# Patient Record
Sex: MALE | Race: WHITE | NOT HISPANIC OR LATINO | Employment: FULL TIME
[De-identification: names, ages, dates, MRNs, and addresses within clinical notes are randomized per-mention and may not be internally consistent; named-entity substitution may affect disease eponyms.]

---

## 2018-12-28 ENCOUNTER — TRANSCRIBE ORDERS (OUTPATIENT)
Dept: SCHEDULING | Age: 58
End: 2018-12-28

## 2018-12-28 DIAGNOSIS — R07.9 CHEST PAIN: Primary | ICD-10-CM

## 2019-01-04 ENCOUNTER — TRANSCRIBE ORDERS (OUTPATIENT)
Dept: REGISTRATION | Facility: HOSPITAL | Age: 59
End: 2019-01-04

## 2019-01-04 ENCOUNTER — HOSPITAL ENCOUNTER (OUTPATIENT)
Dept: CARDIOLOGY | Facility: HOSPITAL | Age: 59
Discharge: HOME | End: 2019-01-04
Attending: FAMILY MEDICINE
Payer: COMMERCIAL

## 2019-01-04 DIAGNOSIS — R07.9 CHEST PAIN: ICD-10-CM

## 2019-01-04 PROCEDURE — 93005 ELECTROCARDIOGRAM TRACING: CPT

## 2019-01-05 LAB
ATRIAL RATE: 84
P AXIS: 48
PR INTERVAL: 146
QRS DURATION: 82
QT INTERVAL: 360
QTC CALCULATION(BAZETT): 425
R AXIS: -5
T WAVE AXIS: 37
VENTRICULAR RATE: 84

## 2019-01-05 PROCEDURE — 93010 ELECTROCARDIOGRAM REPORT: CPT | Performed by: INTERNAL MEDICINE

## 2019-12-27 ENCOUNTER — TELEPHONE (OUTPATIENT)
Dept: GASTROENTEROLOGY | Facility: CLINIC | Age: 59
End: 2019-12-27

## 2020-01-30 NOTE — TELEPHONE ENCOUNTER
Dr Abner Skelton has been contacted to schedule recall egd with no response-please advise   Thank you

## 2021-04-30 ENCOUNTER — TELEPHONE (OUTPATIENT)
Dept: GASTROENTEROLOGY | Facility: CLINIC | Age: 61
End: 2021-04-30

## 2021-04-30 NOTE — TELEPHONE ENCOUNTER
Pt knows that he is due for a colonoscopy  He did receive the letter  He wanted to let us know that he is now ignoring the letter  He is experiencing symptoms  Told him he would have to schedule an OV before they would do the procedure  He does not currently have his license and his wife just had surgery and is unable to drive

## 2021-04-30 NOTE — TELEPHONE ENCOUNTER
Lengthy call w/ Pt  He needs a colonoscopy/has off & on severe abdominal pain; cannot sched OV and/or proc at this time-he cannot drive and his wife had foot surg cannot drive; hopefully his granddaughter will be able to transport him  He spoke w/ someone earlier who told him he will probably need an OV first/I suggested maybe a virtual appt  He has sev'l health issues/was ref'd back to GI from Dr Baez/urology; has off & on severe abdominal pain/when it comes it brings him to his knees; no rectal bleeding; hx of diverticulitis  # cell [phone]

## 2021-05-03 NOTE — TELEPHONE ENCOUNTER
I spoke with patient, his pain is lower left side, bowel movements normal, no blood  Patient placed on schedule 5/4/21  Previous records sent for scanning

## 2021-05-20 ENCOUNTER — TELEPHONE (OUTPATIENT)
Dept: GASTROENTEROLOGY | Facility: CLINIC | Age: 61
End: 2021-05-20

## 2021-05-20 RX ORDER — ASPIRIN 81 MG/1
81 TABLET ORAL DAILY
COMMUNITY
End: 2021-06-02

## 2021-05-20 RX ORDER — ROSUVASTATIN CALCIUM 5 MG/1
5 TABLET, COATED ORAL DAILY
COMMUNITY

## 2021-05-20 RX ORDER — PANTOPRAZOLE SODIUM 20 MG/1
20 TABLET, DELAYED RELEASE ORAL DAILY
COMMUNITY
End: 2021-06-02

## 2021-05-21 ENCOUNTER — TELEPHONE (OUTPATIENT)
Dept: GASTROENTEROLOGY | Facility: CLINIC | Age: 61
End: 2021-05-21

## 2021-05-21 ENCOUNTER — OFFICE VISIT (OUTPATIENT)
Dept: GASTROENTEROLOGY | Facility: CLINIC | Age: 61
End: 2021-05-21
Payer: COMMERCIAL

## 2021-05-21 VITALS
BODY MASS INDEX: 22.44 KG/M2 | SYSTOLIC BLOOD PRESSURE: 140 MMHG | DIASTOLIC BLOOD PRESSURE: 88 MMHG | HEIGHT: 67 IN | WEIGHT: 143 LBS

## 2021-05-21 DIAGNOSIS — Z86.010 PERSONAL HISTORY OF COLONIC POLYPS: ICD-10-CM

## 2021-05-21 DIAGNOSIS — R10.12 LEFT UPPER QUADRANT ABDOMINAL PAIN: Primary | ICD-10-CM

## 2021-05-21 DIAGNOSIS — K22.70 BARRETT'S ESOPHAGUS WITHOUT DYSPLASIA: ICD-10-CM

## 2021-05-21 PROCEDURE — 99214 OFFICE O/P EST MOD 30 MIN: CPT | Performed by: INTERNAL MEDICINE

## 2021-05-21 RX ORDER — DEXTROAMPHETAMINE SACCHARATE, AMPHETAMINE ASPARTATE, DEXTROAMPHETAMINE SULFATE AND AMPHETAMINE SULFATE 2.5; 2.5; 2.5; 2.5 MG/1; MG/1; MG/1; MG/1
10 TABLET ORAL
COMMUNITY

## 2021-05-21 RX ORDER — RAMIPRIL 5 MG/1
CAPSULE ORAL
COMMUNITY
Start: 2021-02-19

## 2021-05-21 NOTE — TELEPHONE ENCOUNTER
Why does your doctor want you to have this procedure? Hx polyps, acid reflux    Do you have kidney disease?  no  If yes, are you on dialysis :     Have you had diverticulitis within the past 2 months? no    Are you diabetic?  no  If yes, insulin dependent: If yes, provide diabetic instructions sheet     Do take iron supplements?  no  If yes, instruct patient to hold iron supplement for 7 days prior    Are you on a blood thinner? no   Was the blood thinner sheet complete and faxed to cardiologist no  Plavix (clopidogrel), Coumadin (warfarin), Lovenox (enoxaparin), Xarelto (rivaroxaban), Pradaxa(dabigatran), Eliquis(apixaban) Savaysa/Lixiana (edoxapan)    Do you have an automatic implantable cardiac defibrillator (AICD)/pacemaker ([facility])? no  Was AICD/pacemaker sheet completed and faxed to cardiologist? no    Are you on home oxygen? no  If yes, continuous or nocturnal:     Have you been treated for MRSA, VRE or any communicable diseases? no    Heart attack, stroke, or stent within 3 months? no  Schedule at Hospital if within 3-6 months   Use nitroglycerin for chest pain in the last 6 months? no    History of organ  transplant?  no   If yes, notify Endo      History of neck/throat/tongue surgery or cancer? no  IF yes, notify Endo      Any problems with anesthesia in the past? no     Was stool C diff ordered?  no Stool specimen needs to be completed prior to procedure    Do have any facial or body piercings?no     Do you have a latex allergy? no     Do have an allergy to metals? (Bravo study only) no     If pediatric patient, was consent faxed to pediatrician no     Patient rights reviewed yes    Miralax prep given and instructions emailed to patient

## 2021-05-21 NOTE — PROGRESS NOTES
[facility] - Outpatient Follow-up Note  Wilian Stone 63 y o  male MRN: [id number]  Encounter: [id number]      ASSESSMENT AND PLAN:     69-year-old male presents with left upper quadrant abdominal pain  History of colonoscopy 2016 with multiple polyps, EGD 2017 revealed Woodward's Esophagus  1  Left upper quadrant abdominal pain    Patient presents today with left upper quadrant abdominal pain  States the pain has been there for quite some time  Patient also follows with urology for BPH and has been prescribed Flomax which he states he has not taken due to increased urination affecting his work  and states ultrasound was negative for kidney stones  He denies nausea, vomiting, diarrhea or bleeding  Abdominal exam normal  however patient did experience point tenderness with palpation to lower part of his left rib cage suggesting possible muscle skeletal involvement  -  Continue follow-up with urologist  -   Follow-up muscle skeletal discomfort with PCP  -  Scheduling for follow-up colonoscopy at [address] [place] [address]      2  Woodward's esophagus without dysplasia    He shared that he does have periodic swallowing problems more so with solid foods  He states that since beginning Protonix he has not had any more problems with acid reflux     -  Scheduling for EGD for surveillance of Woodward's at approximately at [facility]  -  Discussed need to stop smoking and alcohol consumption  -  Continue PPI      3  Personal history of colonic polyps    - Colonoscopy was performed in May of 2016 multiple polyps removed and recommended 5 year follow-up  We will schedule colonoscopy along with EGD       Followup Appointment:   Post colonoscopy and endoscopy     ______________________________________________________________________    Chief Complaint   Patient presents with    Abdominal Pain     left side severe episodes, due to for colon     HPI:     He presents today with complaint of left-sided abdominal pain  He states pain is sharp and intermittent has been recurring over the last year  He states he also sees urology for BPH and has been prescribed Flomax however 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 because 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  He denies nausea vomiting or diarrhea  He does note that upon swallowing at times food does feel like it gets stuck momentarily  He is having daily normal bowel movements and denies any bloody appearance  He states that his GERD symptoms have subsided with the use of the 20 mg daily of Protonix  Patient discloses that he continues to smoke approximately 1 pack of cigarettes a day, 1 cup of coffee a day and has recently began to drink alcohol daily  He shared he is a recovering alcoholic and that he lost his license due to multiple DUI's and he has had 3 suicidal attempts most recently a drug overdose June 2019  He states increased personal issues at home and both his sister and his son's terminal cancer illness diagnoses, he has resumed drinking alcohol  He shared that he engages in anal sex and denies any rectal bleeding or problems at this time        Historical Information   Past Medical History:   Diagnosis Date    Woodward esophagus     Colon polyp     Depression     GERD (gastroesophageal reflux disease)     Hyperlipidemia      Past Surgical History:   Procedure Laterality Date    COLONOSCOPY      KNEE SURGERY      UPPER GASTROINTESTINAL ENDOSCOPY       Social History     Substance and Sexual Activity   Alcohol Use Yes    Frequency: 4 or more times a week    Drinks per session: 1 or 2     Social History     Substance and Sexual Activity   Drug Use Not on file     Social History     Tobacco Use   Smoking Status Current Every Day Smoker   Smokeless Tobacco Never Used     Family History   Problem Relation Age of Onset    Heart disease Mother     Stroke Father     Colon polyps Neg Hx     Colon cancer Neg Hx          Current Outpatient Medications:     amphetamine-dextroamphetamine (ADDERALL) 10 mg tablet    pantoprazole (PROTONIX) 20 mg tablet    ramipril (ALTACE) 5 mg capsule    rosuvastatin (CRESTOR) 5 mg tablet    aspirin (ECOTRIN LOW STRENGTH) 81 mg EC tablet    sertraline (ZOLOFT) 50 mg tablet  No Known Allergies  Reviewed medications and allergies and updated as indicated    PHYSICAL EXAM:    Blood pressure 140/88, height 5' 7" (1 702 m), weight 64 9 kg (143 lb)  Body mass index is 22 4 kg/m²  General Appearance: NAD, cooperative, alert  Eyes: Anicteric, PERRLA, EOMI  ENT:  Normocephalic, atraumatic, normal mucosa  Neck:  Supple, symmetrical, trachea midline  Resp:  Clear to auscultation bilaterally; no rales, rhonchi or wheezing; respirations unlabored   CV:  S1 S2, Regular rate and rhythm; no murmur, rub, or gallop  GI:  Soft, non-tender, non-distended; normal bowel sounds; no masses, no organomegaly   Rectal: Deferred  Musculoskeletal: No cyanosis, clubbing or edema  Normal ROM  Tenderness with palpation to the lower left rib cage  Skin:  No jaundice, rashes, or lesions   Heme/Lymph: No palpable cervical lymphadenopathy  Psych: Normal affect, good eye contact  Neuro: No gross deficits, AAOx3    Lab Results:    No results to discuss    Radiology Results:   No results found

## 2021-05-21 NOTE — LETTER
May 21, 2021     Toi Benoit MD  [address] [address]    Patient: Sophia Thomson   YOB: 1960   Date of Visit: 5/21/2021       Dear Dr Mishel Hartmann: Thank you for referring Soila Parker to me for evaluation  Below are my notes for this consultation  If you have questions, please do not hesitate to call me  I look forward to following your patient along with you  Sincerely,        Rober Rao PA-C        CC: No Recipients  Jacob Tate   5/21/2021  4:22 PM  Sign when Signing Visit  [facility]  Sophia Thomson 63 y o  male MRN: [id number]  Encounter: [id number]      ASSESSMENT AND PLAN:     57-year-old male presents with left upper quadrant abdominal pain  History of colonoscopy 2016 with multiple polyps, EGD 2017 revealed Woodward's Esophagus  1  Left upper quadrant abdominal pain    Patient presents today with left upper quadrant abdominal pain  States the pain has been there for quite some time  Patient also follows with urology for BPH and has been prescribed Flomax which he states he has not taken due to increased urination affecting his work  and states ultrasound was negative for kidney stones  He denies nausea, vomiting, diarrhea or bleeding  Abdominal exam normal  however patient did experience point tenderness with palpation to lower part of his left rib cage suggesting possible muscle skeletal involvement  -  Continue follow-up with urologist  -   Follow-up muscle skeletal discomfort with PCP  -  Scheduling for follow-up colonoscopy at [address] [place] [address]      2  Woodward's esophagus without dysplasia    He shared that he does have periodic swallowing problems more so with solid foods   He states that since beginning Protonix he has not had any more problems with acid reflux     -  Scheduling for EGD for surveillance of Woodward's at approximately at [facility]  - Discussed need to stop smoking and alcohol consumption  -  Continue PPI      3  Personal history of colonic polyps    - Colonoscopy was performed in May of 2016 multiple polyps removed and recommended 5 year follow-up  We will schedule colonoscopy along with EGD  Followup Appointment:   Post colonoscopy and endoscopy     ______________________________________________________________________    Chief Complaint   Patient presents with    Abdominal Pain     left side severe episodes, due to for colon     HPI:     He presents today with complaint of left-sided abdominal pain  He states pain is sharp and intermittent has been recurring over the last year  He states he also sees urology for BPH and has been prescribed Flomax however 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 because 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  He denies nausea vomiting or diarrhea  He does note that upon swallowing at times food does feel like it gets stuck momentarily  He is having daily normal bowel movements and denies any bloody appearance  He states that his GERD symptoms have subsided with the use of the 20 mg daily of Protonix  Patient discloses that he continues to smoke approximately 1 pack of cigarettes a day, 1 cup of coffee a day and has recently began to drink alcohol daily  He shared he is a recovering alcoholic and that he lost his license due to multiple DUI's and he has had 3 suicidal attempts most recently a drug overdose June 2019  He states increased personal issues at home and both his sister and his son's terminal cancer illness diagnoses, he has resumed drinking alcohol  He shared that he engages in anal sex and denies any rectal bleeding or problems at this time        Historical Information   Past Medical History:   Diagnosis Date    Woodward esophagus     Colon polyp     Depression     GERD (gastroesophageal reflux disease)     Hyperlipidemia      Past Surgical History:   Procedure Laterality Date    COLONOSCOPY      KNEE SURGERY      UPPER GASTROINTESTINAL ENDOSCOPY       Social History     Substance and Sexual Activity   Alcohol Use Yes    Frequency: 4 or more times a week    Drinks per session: 1 or 2     Social History     Substance and Sexual Activity   Drug Use Not on file     Social History     Tobacco Use   Smoking Status Current Every Day Smoker   Smokeless Tobacco Never Used     Family History   Problem Relation Age of Onset    Heart disease Mother     Stroke Father     Colon polyps Neg Hx     Colon cancer Neg Hx          Current Outpatient Medications:     amphetamine-dextroamphetamine (ADDERALL) 10 mg tablet    pantoprazole (PROTONIX) 20 mg tablet    ramipril (ALTACE) 5 mg capsule    rosuvastatin (CRESTOR) 5 mg tablet    aspirin (ECOTRIN LOW STRENGTH) 81 mg EC tablet    sertraline (ZOLOFT) 50 mg tablet  No Known Allergies  Reviewed medications and allergies and updated as indicated    PHYSICAL EXAM:    Blood pressure 140/88, height 5' 7" (1 702 m), weight 64 9 kg (143 lb)  Body mass index is 22 4 kg/m²  General Appearance: NAD, cooperative, alert  Eyes: Anicteric, PERRLA, EOMI  ENT:  Normocephalic, atraumatic, normal mucosa  Neck:  Supple, symmetrical, trachea midline  Resp:  Clear to auscultation bilaterally; no rales, rhonchi or wheezing; respirations unlabored   CV:  S1 S2, Regular rate and rhythm; no murmur, rub, or gallop  GI:  Soft, non-tender, non-distended; normal bowel sounds; no masses, no organomegaly   Rectal: Deferred  Musculoskeletal: No cyanosis, clubbing or edema  Normal ROM  Tenderness with palpation to the lower left rib cage  Skin:  No jaundice, rashes, or lesions   Heme/Lymph: No palpable cervical lymphadenopathy  Psych: Normal affect, good eye contact  Neuro: No gross deficits, AAOx3    Lab Results:    No results to discuss    Radiology Results:   No results found

## 2021-05-21 NOTE — H&P (VIEW-ONLY)
[facility] - Outpatient Follow-up Note  Cady Allen 63 y o  male MRN: [id number]  Encounter: [id number]      ASSESSMENT AND PLAN:     74-year-old male presents with left upper quadrant abdominal pain  History of colonoscopy 2016 with multiple polyps, EGD 2017 revealed Woodward's Esophagus  1  Left upper quadrant abdominal pain    Patient presents today with left upper quadrant abdominal pain  States the pain has been there for quite some time  Patient also follows with urology for BPH and has been prescribed Flomax which he states he has not taken due to increased urination affecting his work  and states ultrasound was negative for kidney stones  He denies nausea, vomiting, diarrhea or bleeding  Abdominal exam normal  however patient did experience point tenderness with palpation to lower part of his left rib cage suggesting possible muscle skeletal involvement  -  Continue follow-up with urologist  -   Follow-up muscle skeletal discomfort with PCP  -  Scheduling for follow-up colonoscopy at [address] [place] [address]      2  Woodward's esophagus without dysplasia    He shared that he does have periodic swallowing problems more so with solid foods  He states that since beginning Protonix he has not had any more problems with acid reflux     -  Scheduling for EGD for surveillance of Woodward's at approximately at [facility]  -  Discussed need to stop smoking and alcohol consumption  -  Continue PPI      3  Personal history of colonic polyps    - Colonoscopy was performed in May of 2016 multiple polyps removed and recommended 5 year follow-up  We will schedule colonoscopy along with EGD       Followup Appointment:   Post colonoscopy and endoscopy     ______________________________________________________________________    Chief Complaint   Patient presents with    Abdominal Pain     left side severe episodes, due to for colon     HPI:     He presents today with complaint of left-sided abdominal pain  He states pain is sharp and intermittent has been recurring over the last year  He states he also sees urology for BPH and has been prescribed Flomax however 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 because 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  He denies nausea vomiting or diarrhea  He does note that upon swallowing at times food does feel like it gets stuck momentarily  He is having daily normal bowel movements and denies any bloody appearance  He states that his GERD symptoms have subsided with the use of the 20 mg daily of Protonix  Patient discloses that he continues to smoke approximately 1 pack of cigarettes a day, 1 cup of coffee a day and has recently began to drink alcohol daily  He shared he is a recovering alcoholic and that he lost his license due to multiple DUI's and he has had 3 suicidal attempts most recently a drug overdose June 2019  He states increased personal issues at home and both his sister and his son's terminal cancer illness diagnoses, he has resumed drinking alcohol  He shared that he engages in anal sex and denies any rectal bleeding or problems at this time        Historical Information   Past Medical History:   Diagnosis Date    Woodward esophagus     Colon polyp     Depression     GERD (gastroesophageal reflux disease)     Hyperlipidemia      Past Surgical History:   Procedure Laterality Date    COLONOSCOPY      KNEE SURGERY      UPPER GASTROINTESTINAL ENDOSCOPY       Social History     Substance and Sexual Activity   Alcohol Use Yes    Frequency: 4 or more times a week    Drinks per session: 1 or 2     Social History     Substance and Sexual Activity   Drug Use Not on file     Social History     Tobacco Use   Smoking Status Current Every Day Smoker   Smokeless Tobacco Never Used     Family History   Problem Relation Age of Onset    Heart disease Mother     Stroke Father     Colon polyps Neg Hx     Colon cancer Neg Hx          Current Outpatient Medications:     amphetamine-dextroamphetamine (ADDERALL) 10 mg tablet    pantoprazole (PROTONIX) 20 mg tablet    ramipril (ALTACE) 5 mg capsule    rosuvastatin (CRESTOR) 5 mg tablet    aspirin (ECOTRIN LOW STRENGTH) 81 mg EC tablet    sertraline (ZOLOFT) 50 mg tablet  No Known Allergies  Reviewed medications and allergies and updated as indicated    PHYSICAL EXAM:    Blood pressure 140/88, height 5' 7" (1 702 m), weight 64 9 kg (143 lb)  Body mass index is 22 4 kg/m²  General Appearance: NAD, cooperative, alert  Eyes: Anicteric, PERRLA, EOMI  ENT:  Normocephalic, atraumatic, normal mucosa  Neck:  Supple, symmetrical, trachea midline  Resp:  Clear to auscultation bilaterally; no rales, rhonchi or wheezing; respirations unlabored   CV:  S1 S2, Regular rate and rhythm; no murmur, rub, or gallop  GI:  Soft, non-tender, non-distended; normal bowel sounds; no masses, no organomegaly   Rectal: Deferred  Musculoskeletal: No cyanosis, clubbing or edema  Normal ROM  Tenderness with palpation to the lower left rib cage  Skin:  No jaundice, rashes, or lesions   Heme/Lymph: No palpable cervical lymphadenopathy  Psych: Normal affect, good eye contact  Neuro: No gross deficits, AAOx3    Lab Results:    No results to discuss    Radiology Results:   No results found

## 2021-06-01 ENCOUNTER — ANESTHESIA EVENT (OUTPATIENT)
Dept: GASTROENTEROLOGY | Facility: AMBULATORY SURGERY CENTER | Age: 61
End: 2021-06-01

## 2021-06-01 PROBLEM — K21.9 GASTROESOPHAGEAL REFLUX DISEASE: Status: ACTIVE | Noted: 2021-06-01

## 2021-06-01 NOTE — ANESTHESIA PREPROCEDURE EVALUATION
Procedure:  COLONOSCOPY  EGD    Relevant Problems   CARDIO   (+) HTN (hypertension)      GI/HEPATIC  Barretts   (+) Gastroesophageal reflux disease      NEURO/PSYCH  ETOH abuse in past  Now has 1 drink per day  (+) Depression   (+) TIA (transient ischemic attack) 2016      PULMONARY   (+) Smoking 1 ppd        Physical Exam    Airway    Mallampati score: II  TM Distance: >3 FB  Neck ROM: full     Dental       Cardiovascular  Cardiovascular exam normal    Pulmonary  Pulmonary exam normal     Other Findings        Anesthesia Plan  ASA Score- 3     Anesthesia Type- IV sedation with anesthesia with ASA Monitors  Additional Monitors:   Airway Plan:           Plan Factors-    Chart reviewed  Patient summary reviewed  Induction- intravenous  Postoperative Plan-     Informed Consent- Anesthetic plan and risks discussed with patient  I personally reviewed this patient with the CRNA  Discussed and agreed on the Anesthesia Plan with the CRNA  Kae Mohan

## 2021-06-02 ENCOUNTER — HOSPITAL ENCOUNTER (OUTPATIENT)
Dept: GASTROENTEROLOGY | Facility: AMBULATORY SURGERY CENTER | Age: 61
Discharge: HOME | End: 2021-06-02
Payer: COMMERCIAL

## 2021-06-02 ENCOUNTER — ANESTHESIA (OUTPATIENT)
Dept: GASTROENTEROLOGY | Facility: AMBULATORY SURGERY CENTER | Age: 61
End: 2021-06-02

## 2021-06-02 VITALS
DIASTOLIC BLOOD PRESSURE: 89 MMHG | HEART RATE: 81 BPM | SYSTOLIC BLOOD PRESSURE: 147 MMHG | TEMPERATURE: 99.3 F | OXYGEN SATURATION: 99 % | RESPIRATION RATE: 15 BRPM

## 2021-06-02 DIAGNOSIS — R10.12 LEFT UPPER QUADRANT ABDOMINAL PAIN: ICD-10-CM

## 2021-06-02 DIAGNOSIS — Z86.010 PERSONAL HISTORY OF COLONIC POLYPS: ICD-10-CM

## 2021-06-02 DIAGNOSIS — K22.70 BARRETT'S ESOPHAGUS WITHOUT DYSPLASIA: ICD-10-CM

## 2021-06-02 PROBLEM — F32.A DEPRESSION: Status: ACTIVE | Noted: 2021-06-02

## 2021-06-02 PROBLEM — F17.200 SMOKING: Status: ACTIVE | Noted: 2021-06-02

## 2021-06-02 PROBLEM — G45.9 TIA (TRANSIENT ISCHEMIC ATTACK): Status: ACTIVE | Noted: 2021-06-02

## 2021-06-02 PROBLEM — I10 HTN (HYPERTENSION): Status: ACTIVE | Noted: 2021-06-02

## 2021-06-02 PROCEDURE — 45380 COLONOSCOPY AND BIOPSY: CPT | Performed by: INTERNAL MEDICINE

## 2021-06-02 PROCEDURE — 43239 EGD BIOPSY SINGLE/MULTIPLE: CPT | Performed by: INTERNAL MEDICINE

## 2021-06-02 PROCEDURE — 45385 COLONOSCOPY W/LESION REMOVAL: CPT | Performed by: INTERNAL MEDICINE

## 2021-06-02 RX ORDER — LIDOCAINE HYDROCHLORIDE 10 MG/ML
INJECTION, SOLUTION EPIDURAL; INFILTRATION; INTRACAUDAL; PERINEURAL AS NEEDED
Status: DISCONTINUED | OUTPATIENT
Start: 2021-06-02 | End: 2021-06-02 | Stop reason: HOSPADM

## 2021-06-02 RX ORDER — PROPOFOL 10 MG/ML
INJECTION, EMULSION INTRAVENOUS AS NEEDED
Status: DISCONTINUED | OUTPATIENT
Start: 2021-06-02 | End: 2021-06-02 | Stop reason: HOSPADM

## 2021-06-02 RX ORDER — SODIUM CHLORIDE 9 MG/ML
50 INJECTION, SOLUTION INTRAVENOUS CONTINUOUS
Status: DISCONTINUED | OUTPATIENT
Start: 2021-06-02 | End: 2021-06-06 | Stop reason: HOSPADM

## 2021-06-02 RX ORDER — OMEPRAZOLE 40 MG/1
40 CAPSULE, DELAYED RELEASE ORAL DAILY
COMMUNITY

## 2021-06-02 RX ORDER — SODIUM CHLORIDE 9 MG/ML
INJECTION, SOLUTION INTRAVENOUS CONTINUOUS PRN
Status: DISCONTINUED | OUTPATIENT
Start: 2021-06-02 | End: 2021-06-02 | Stop reason: HOSPADM

## 2021-06-02 RX ADMIN — PROPOFOL 50 MG: 10 INJECTION, EMULSION INTRAVENOUS at 11:20

## 2021-06-02 RX ADMIN — PROPOFOL 50 MG: 10 INJECTION, EMULSION INTRAVENOUS at 11:31

## 2021-06-02 RX ADMIN — SODIUM CHLORIDE 50 ML/HR: 9 INJECTION, SOLUTION INTRAVENOUS at 10:44

## 2021-06-02 RX ADMIN — PROPOFOL 150 MG: 10 INJECTION, EMULSION INTRAVENOUS at 11:14

## 2021-06-02 RX ADMIN — SODIUM CHLORIDE: 9 INJECTION, SOLUTION INTRAVENOUS at 11:09

## 2021-06-02 RX ADMIN — PROPOFOL 50 MG: 10 INJECTION, EMULSION INTRAVENOUS at 11:41

## 2021-06-02 RX ADMIN — LIDOCAINE HYDROCHLORIDE 50 MG: 10 INJECTION, SOLUTION EPIDURAL; INFILTRATION; INTRACAUDAL; PERINEURAL at 11:15

## 2021-06-02 RX ADMIN — PROPOFOL 50 MG: 10 INJECTION, EMULSION INTRAVENOUS at 11:24

## 2021-06-02 RX ADMIN — PROPOFOL 50 MG: 10 INJECTION, EMULSION INTRAVENOUS at 11:27

## 2021-06-02 RX ADMIN — PROPOFOL 50 MG: 10 INJECTION, EMULSION INTRAVENOUS at 11:36

## 2021-06-02 NOTE — DISCHARGE INSTRUCTIONS
Hemorrhoids   WHAT YOU NEED TO KNOW:   What are hemorrhoids? Hemorrhoids are swollen blood vessels inside your rectum (internal hemorrhoids) or on your anus (external hemorrhoids)  Sometimes a hemorrhoid may prolapse  This means it extends out of your anus  What increases my risk for hemorrhoids? · Pregnancy or obesity    · Straining or sitting for a long time during bowel movements    · Liver disease    · Weak muscles around the anus caused by older age, rectal surgery, or anal intercourse    · A lack of physical activity    · Chronic diarrhea or constipation    · A low-fiber diet    What are the signs and symptoms of hemorrhoids? · Pain or itching around your anus or inside your rectum    · Swelling or bumps around your anus    · Bright red blood in your bowel movement, on the toilet paper, or in the toilet bowl    · Tissue bulging out of your anus (prolapsed hemorrhoids)    · Incontinence (poor control over urine or bowel movements)    How are hemorrhoids diagnosed? Your healthcare provider will ask about your symptoms, the foods you eat, and your bowel movements  He or she will examine your anus for external hemorrhoids  You may need the following:  · A digital rectal exam  is a test to check for hemorrhoids  Your healthcare provider will put a gloved finger inside your anus to feel for the hemorrhoids  · An anoscopy  is a test that uses a scope (small tube with a light and camera on the end) to look at your hemorrhoids  How are hemorrhoids treated? Treatment will depend on your symptoms  You may need any of the following:  · Medicines  can help decrease pain and swelling, and soften your bowel movement  The medicine may be a pill, pad, cream, or ointment  · Procedures  may be used to shrink or remove your hemorrhoid  Examples include rubber-band ligation, sclerotherapy, and photocoagulation  These procedures may be done in your healthcare provider's office   Ask your healthcare provider for more information about these procedures  · Surgery  may be needed to shrink or remove your hemorrhoids  How can I manage my symptoms? · Apply ice on your anus for 15 to 20 minutes every hour or as directed  Use an ice pack, or put crushed ice in a plastic bag  Cover it with a towel before you apply it to your anus  Ice helps prevent tissue damage and decreases swelling and pain  · Take a sitz bath  Fill a bathtub with 4 to 6 inches of warm water  You may also use a sitz bath pan that fits inside a toilet bowl  Sit in the sitz bath for 15 minutes  Do this 3 times a day, and after each bowel movement  The warm water can help decrease pain and swelling  · Keep your anal area clean  Gently wash the area with warm water daily  Soap may irritate the area  After a bowel movement, wipe with moist towelettes or wet toilet paper  Dry toilet paper can irritate the area  How can I help prevent hemorrhoids? · Do not strain to have a bowel movement  Do not sit on the toilet too long  These actions can increase pressure on the tissues in your rectum and anus  · Drink plenty of liquids  Liquids can help prevent constipation  Ask how much liquid to drink each day and which liquids are best for you  · Eat a variety of high-fiber foods  Examples include fruits, vegetables, and whole grains  Ask your healthcare provider how much fiber you need each day  You may need to take a fiber supplement  · Exercise as directed  Exercise, such as walking, may make it easier to have a bowel movement  Ask your healthcare provider to help you create an exercise plan  · Do not have anal sex  Anal sex can weaken the skin around your rectum and anus  · Avoid heavy lifting  This can cause straining and increase your risk for another hemorrhoid  When should I seek immediate care? · You have severe pain in your rectum or around your anus      · You have severe pain in your abdomen and you are vomiting  · You have bleeding from your anus that soaks through your underwear  When should I contact my healthcare provider? · You have frequent and painful bowel movements  · Your hemorrhoid looks or feels more swollen than usual      · You do not have a bowel movement for 2 days or more  · You see or feel tissue coming through your anus  · You have questions or concerns about your condition or care  CARE AGREEMENT:   You have the right to help plan your care  Learn about your health condition and how it may be treated  Discuss treatment options with your healthcare providers to decide what care you want to receive  You always have the right to refuse treatment  The above information is an  only  It is not intended as medical advice for individual conditions or treatments  Talk to your doctor, nurse or pharmacist before following any medical regimen to see if it is safe and effective for you  © Copyright [address] Information is for End User's use only and may not be sold, redistributed or otherwise used for commercial purposes  All illustrations and images included in CareNotes® are the copyrighted property of A D A M , Inc  or [address] [place] [address]  Colorectal Polyps   WHAT YOU NEED TO KNOW:   What are colorectal polyps? Colorectal polyps are small growths of tissue in the lining of the colon and rectum  Most polyps are hyperplastic polyps and are usually benign (noncancerous)  Certain types of polyps, called adenomatous polyps, may turn into cancer  What increases my risk of colorectal polyps? The exact cause of colorectal polyps is unknown   The following may increase your risk:  · Older age    · A diet of foods high in fat and low in fiber     · Family history of polyps    · Intestinal diseases, such as Crohn's disease or ulcerative colitis    · An unhealthy lifestyle, such as physical inactivity, smoking, or drinking alcohol    · Obesity    What are the signs and symptoms of colorectal polyps? · Blood in your bowel movement or bleeding from the rectum    · Change in bowel movement habits, such as diarrhea and constipation    · Abdominal pain    How are colorectal polyps diagnosed? You should have fecal blood screening once a year for colorectal disease if you are over 48years old  You should be screened earlier if you have an intestinal disease or a family history of polyps or colorectal cancer  During this screening, a sample of your bowel movement is checked for blood, which may be an early sign of colorectal polyps or cancer  You may also need any of the following tests:  · Digital rectal exam:  Your healthcare provider will examine your anus and use a finger to check your rectum for polyps  · Barium enema: A barium enema is an x-ray of the colon  A tube is put into your anus, and a liquid called barium is put through the tube  Barium is used so that healthcare providers can see your colon better on the x-ray film  · Virtual colonoscopy: This is a CT scan that takes pictures of the inside of your colon and rectum  A small, flexible tube is put into your rectum and air or carbon dioxide (gas) is used to expand your colon  This lets healthcare providers clearly see your colon and any polyps on a monitor  · Colonoscopy or sigmoidoscopy: These procedures help your healthcare provider see the inside of your colon using a flexible tube with a small light and camera on the end  During a sigmoidoscopy, your healthcare provider will only look at rectum and lower colon  During a colonoscopy, healthcare providers will look at the full length of your colon  Healthcare providers may remove a small amount of tissue from the colon for a biopsy  How are colorectal polyps treated? A polypectomy is a minimally invasive procedure to remove your polyps  They may be removed during a colonoscopy or sigmoidoscopy   Your healthcare provider may need to remove the polyps with a laparoscope  Laparoscopy is done by inserting a small, flexible scope into incisions made on your abdomen  What are the risks of colorectal polyps? You may bleed during a colonoscopy procedure  Your bowel may be perforated (torn) when polyps are removed  This may lead to an open abdominal surgery  During surgery, you may bleed too much or get an infection  Adenomatous polyps that are not removed may turn into cancer and become more difficult to treat  Where can I find support and more information? · [facility] ([facility] [place]  [address], [place] [address]  Phone: [phone]  Web Address: Tanya Patel  [facility] nih gov    When should I contact my healthcare provider? · You have a fever  · You have chills, a cough, or feel weak and achy  · You have abdominal pain that does not go away or gets worse after you take medicine  · Your abdomen is swollen  · You are losing weight without trying  · You have questions or concerns about your condition or care  When should I seek immediate care or call 911? · You have sudden shortness of breath  · You have a fast heart rate, fast breathing, or are too dizzy to stand up  · You have severe abdominal pain  · You see blood in your bowel movement  CARE AGREEMENT:   You have the right to help plan your care  Learn about your health condition and how it may be treated  Discuss treatment options with your healthcare providers to decide what care you want to receive  You always have the right to refuse treatment  The above information is an  only  It is not intended as medical advice for individual conditions or treatments  Talk to your doctor, nurse or pharmacist before following any medical regimen to see if it is safe and effective for you    © Copyright [address] [place] [address] Drive Information is for End User's use only and may not be sold, redistributed or otherwise used for commercial purposes  All illustrations and images included in CareNotes® are the copyrighted property of A D A M , Inc  or Janice Walls  Hiatal Hernia   WHAT YOU NEED TO KNOW:   What is a hiatal hernia? A hiatal hernia is a condition that causes part of your stomach to bulge through the hiatus (small opening) in your diaphragm  The part of the stomach may move up and down, or it may get trapped above the diaphragm  What increases my risk for a hiatal hernia? The exact cause of a hiatal hernia is not known  You may have been born with a large hiatus  The following may increase your risk of a hiatal hernia:  · Obesity    · Older age    · Medical conditions such as diverticulosis or esophagitis    · Previous surgery of the esophagus or stomach or trauma such as from a motor vehicle accident    What are the types of hiatal hernia? · Type I (sliding hiatal hernia): A portion of the stomach slides in and out of the hiatus  This type is the most common and usually causes gastroesophageal reflux disease (GERD)  GERD occurs when the esophageal sphincter does not close properly and causes acid reflux  The esophageal sphincter is the lower muscle of the esophagus  · Type II (paraesophageal hiatal hernia):  Type II hiatal hernia forms when a part of the stomach squeezes through the hiatus and lies next to the esophagus  · Type III (combined):  Type III hiatal hernia is a combination of a sliding and a paraesophageal hiatal hernia  · Type IV (complex paraesophageal hiatal hernia): The whole stomach, the small and large bowels, spleen, pancreas, or liver is pushed up into the chest     What are the signs and symptoms of a hiatal hernia? The most common symptom is heartburn  This usually occurs after meals and spreads to your neck, jaw, or shoulder   You may have no signs or symptoms, or you may have any of the following:  · Abdominal pain, especially in the area just above your navel    · Bitter or acid taste in your mouth    · Trouble swallowing    · Coughing or hoarseness    · Chest pain or shortness of breath that occurs after eating    · Frequent burping or hiccups    · Uncomfortable feeling of fullness after eating    How is a hiatal hernia diagnosed? · An upper GI series test  includes x-rays of your esophagus, stomach, and your small intestines  It is also called a barium swallow test  You will be given barium (a chalky liquid) to drink before the pictures are taken  This liquid helps your stomach and intestines show up better on the x-rays  An upper GI series can show if you have an ulcer, a blocked intestine, or other problems  · An endoscopy  uses a scope to see the inside of your digestive tract  A scope is a long, bendable tube with a light on the end of it  A camera may be hooked to the scope to take pictures  How is a hiatal hernia treated? Treatment depends on the type of hiatal hernia you have and on your symptoms  You may not need any treatment  You may need any of the following:  · Medicines  may be given to relieve heartburn symptoms  These medicines help to decrease or block stomach acid  You may also be given medicines that help to tighten the esophageal sphincter  · Surgery  may be done when medicines cannot control your symptoms, or other problems are present  Your healthcare provider may also suggest surgery depending on the type of hernia you have  Your healthcare provider can put your stomach back into its normal location  He may make the hiatus (hole) smaller and anchor your stomach in your abdomen  Fundoplication is a surgery that wraps the upper part of the stomach around the esophageal sphincter to strengthen it  How can I manage symptoms? The following nutrition and lifestyle changes may be recommended to relieve symptoms of heartburn  · Avoid foods that make your symptoms worse    These may include spicy foods, fruit juices, alcohol, caffeine, chocolate, and mint      · Eat several small meals during the day  Small meals give your stomach less food to digest     · Avoid lying down and bending forward after you eat  Do not eat meals 2 to 3 hours before bedtime  This decreases your risk for reflux  · Maintain a healthy weight  If you are overweight, weight loss may help relieve your symptoms  · Sleep with your head elevated  at least 6 inches  · Do not smoke  Smoking can increase your symptoms of heartburn  When should I seek immediate care? · You have severe abdominal pain  · You try to vomit but nothing comes out (retching)  · You have severe chest pain and sudden trouble breathing  · Your bowel movements are black or bloody  · Your vomit looks like coffee grounds or has blood in it  When should I contact my healthcare provider? · Your symptoms are getting worse  · You have nausea, and you are vomiting  · You are losing weight without trying  · You have questions or concerns about your condition or care  CARE AGREEMENT:   You have the right to help plan your care  Learn about your health condition and how it may be treated  Discuss treatment options with your healthcare providers to decide what care you want to receive  You always have the right to refuse treatment  The above information is an  only  It is not intended as medical advice for individual conditions or treatments  Talk to your doctor, nurse or pharmacist before following any medical regimen to see if it is safe and effective for you  © Copyright [address] Information is for End User's use only and may not be sold, redistributed or otherwise used for commercial purposes  All illustrations and images included in CareNotes® are the copyrighted property of [employer]  or [address] [place] [address]   WHAT YOU NEED TO KNOW:   What is Woodward esophagus?   Woodward esophagus is a condition that is also called intestinal metaplasia  Cells that line your esophagus change into cells that are like intestine cells  The change increases your risk for esophageal cancer  What increases my risk for Woodward esophagus? The exact cause of Woodward esophagus is not known  The following may increase your risk:  · Long-term gastroesophageal reflux disease (GERD)    · Age older than 48    · A family history of GERD, Woodward esophagus, or esophageal cancer    · Obesity    · Smoking cigarettes    What are the signs and symptoms of Woodward esophagus? Signs and symptoms are usually related to the signs and symptoms of GERD  You may have any of the following:  · Heartburn (burning pain in your chest)    · Pain after meals that spreads to your neck, jaw, or shoulder    · Pain that gets better when you change positions    · Bitter or acid taste in your mouth    · A dry cough    · Trouble swallowing or pain with swallowing    · Hoarseness or a sore throat    · Burping or hiccups    · Feeling full soon after you start eating    How is Woodward esophagus diagnosed? An endoscopy is a procedure used to see the inside of your esophagus and stomach  A scope is a long, bendable tube with a light on the end of it  A camera may be hooked to the scope  Your healthcare provider may also take tissue samples to be tested for dysplasia (abnormal changes in your cells and tissues)  If dysplasia is found, it will be given a grade to describe the amount of change  The grade can range from negative (no dysplasia) to high-grade (a large amount)  You have a higher risk for cancer with high-grade dysplasia  How is Woodward esophagus treated? Treatment depends on the grade of dysplasia  The goal of treatment is to control your symptoms and prevent esophageal cancer  Your healthcare provider may also suggest that you make changes in the foods you eat and in your lifestyle   You may need any of the following:  · Anti-reflux medicines  help decrease the stomach acid that can irritate your esophagus and stomach  These medicines may include proton pump inhibitors (PPI) and histamine type-2 receptor (H2) blockers  You may also be given medicines to stop vomiting  · Surgery or other procedures  may be done if you have high-grade dysplasia  Abnormal cells may be killed with heat or by freezing them  Light may be used to kill abnormal cells  It is used in combination with medicines that make the abnormal cells sensitive to light  Surgery may be used to wrap the upper part of your stomach around the esophageal sphincter to strengthen it  Surgery may be needed to remove part or all of your esophagus  What can I do to manage Woodward esophagus? · Do not eat foods that make your symptoms worse  Examples are chocolate, garlic, onions, spicy or fatty foods, citrus fruits (oranges), and tomato-based foods (spaghetti sauce)  Do not drink alcohol, drinks that contain caffeine, or carbonated drinks, such as soda  Ask your healthcare provider if there are other foods and drinks you should not have  · Maintain a healthy weight  If you are overweight, weight loss may help relieve symptoms  Ask your healthcare provider about safe ways to lose weight  · Do not smoke  If you smoke, it is never too late to quit  Smoking may worsen acid reflux  Ask your healthcare provider for information if you need help quitting  Where can I get support and more information? · [address] [place] [address]  Phone: [phone]  Web Address: http://[employer]/  org    Call your local emergency number (911 in the [facility] [facility]) if:   · You have severe chest pain and shortness of breath  When should I seek immediate care? · Your bowel movements are black, bloody, or tarry  · Your vomit looks like coffee grounds or has blood in it  When should I call my doctor? · Your symptoms do not improve with treatment      · You have questions or concerns about your condition or care     CARE AGREEMENT:   You have the right to help plan your care  Learn about your health condition and how it may be treated  Discuss treatment options with your healthcare providers to decide what care you want to receive  You always have the right to refuse treatment  The above information is an  only  It is not intended as medical advice for individual conditions or treatments  Talk to your doctor, nurse or pharmacist before following any medical regimen to see if it is safe and effective for you  © Copyright [address] Information is for End User's use only and may not be sold, redistributed or otherwise used for commercial purposes  All illustrations and images included in CareNotes® are the copyrighted property of A [employer] A M , Inc  or [address] [place] [address] [facility]  Upper Endoscopy and Colonoscopy   WHAT YOU NEED TO KNOW:   An upper endoscopy is also called an upper gastrointestinal (GI) endoscopy, or an esophagogastroduodenoscopy (EGD)  It is a procedure to examine the inside of your esophagus, stomach, and duodenum (first part of the small intestine) with a scope  You may feel bloated, gassy, or have some abdominal discomfort after your procedure  Your throat may be sore for 24 to 36 hours  You may burp or pass gas from air that is still inside your body  A colonoscopy is a procedure to examine the inside of your colon (intestine) with a scope  Polyps or tissue growths may have been removed during your colonoscopy  It is normal to feel bloated and to have some abdominal discomfort  You should be passing gas  If you have hemorrhoids or you had polyps removed, you may have a small amount of bleeding  DISCHARGE INSTRUCTIONS:   Seek care immediately if:   · You have sudden, severe abdominal pain  · You have problems swallowing  · You have a large amount of black, sticky bowel movements or blood in your bowel movements  · You have sudden trouble breathing  · You feel weak, lightheaded, or faint or your heart beats faster than normal for you  Contact your healthcare provider if:   · You have a fever and chills  · You have nausea or are vomiting  · Your abdomen is bloated or feels full and hard  · You have abdominal pain  · You have a large amount of black, sticky bowel movements or blood in your bowel movements  · You have not had a bowel movement for 3 days after your procedure  · You have rash or hives  · You have questions or concerns about your procedure  Activity:   ·       Do not lift, strain, or run for 24 hours after your procedure  ·       Rest after your procedure  You have been given medicine to relax you  Do not drive or make important decisions until the day after your procedure  Return to your normal activity as directed  ·       Relieve gas and discomfort from bloating by lying on your right side with a heating pad on your abdomen  You may need to take short walks to help the gas move out  Eat small meals until bloating is relieved  Follow up with your healthcare provider as directed: Write down your questions so you remember to ask them during your visits  If you take a blood thinner, please review the specific instructions from your endoscopist about when you should resume it  These can be found in the Recommendation and Your Medication list sections of this After Visit Summary

## 2021-06-02 NOTE — ANESTHESIA POSTPROCEDURE EVALUATION
Post-Op Assessment Note    CV Status:  Stable    Pain management: adequate     Mental Status:  Alert and awake   Hydration Status:  Euvolemic   PONV Controlled:  Controlled   Airway Patency:  Patent      Post Op Vitals Reviewed: Yes            No complications documented      BP  123/79   Temp      Pulse  90   Resp   16   SpO2   95 NC

## 2021-06-02 NOTE — INTERVAL H&P NOTE
H&P reviewed  After examining the patient I find no changes in the patients condition since the H&P had been written      Vitals:    06/02/21 1034   BP: (!) 190/96   Pulse: 82   Resp: 18   Temp: 99 3 °F (37 4 °C)   SpO2: 99%